# Patient Record
Sex: FEMALE | Race: WHITE | Employment: OTHER | URBAN - METROPOLITAN AREA
[De-identification: names, ages, dates, MRNs, and addresses within clinical notes are randomized per-mention and may not be internally consistent; named-entity substitution may affect disease eponyms.]

---

## 2017-12-27 ENCOUNTER — HOSPITAL ENCOUNTER (INPATIENT)
Age: 77
LOS: 5 days | Discharge: HOME | DRG: 389 | End: 2018-01-01
Attending: EMERGENCY MEDICINE | Admitting: INTERNAL MEDICINE
Payer: MEDICARE

## 2017-12-27 ENCOUNTER — APPOINTMENT (OUTPATIENT)
Dept: CT IMAGING | Age: 77
DRG: 389 | End: 2017-12-27
Payer: MEDICARE

## 2017-12-27 DIAGNOSIS — K56.609 SMALL BOWEL OBSTRUCTION (HCC): Primary | ICD-10-CM

## 2017-12-27 LAB
ABSOLUTE EOS #: 0 K/UL (ref 0–0.4)
ABSOLUTE IMMATURE GRANULOCYTE: ABNORMAL K/UL (ref 0–0.3)
ABSOLUTE LYMPH #: 1 K/UL (ref 1–4.8)
ABSOLUTE MONO #: 0.6 K/UL (ref 0.2–0.8)
ANION GAP SERPL CALCULATED.3IONS-SCNC: 15 MMOL/L (ref 9–17)
BASOPHILS # BLD: 0 % (ref 0–2)
BASOPHILS ABSOLUTE: 0 K/UL (ref 0–0.2)
BUN BLDV-MCNC: 25 MG/DL (ref 8–23)
BUN/CREAT BLD: 33 (ref 9–20)
CALCIUM SERPL-MCNC: 10 MG/DL (ref 8.6–10.4)
CHLORIDE BLD-SCNC: 102 MMOL/L (ref 98–107)
CO2: 22 MMOL/L (ref 20–31)
CREAT SERPL-MCNC: 0.76 MG/DL (ref 0.5–0.9)
DIFFERENTIAL TYPE: ABNORMAL
EOSINOPHILS RELATIVE PERCENT: 0 % (ref 1–4)
GFR AFRICAN AMERICAN: >60 ML/MIN
GFR NON-AFRICAN AMERICAN: >60 ML/MIN
GFR SERPL CREATININE-BSD FRML MDRD: ABNORMAL ML/MIN/{1.73_M2}
GFR SERPL CREATININE-BSD FRML MDRD: ABNORMAL ML/MIN/{1.73_M2}
GLUCOSE BLD-MCNC: 169 MG/DL (ref 70–99)
HCT VFR BLD CALC: 44.3 % (ref 36–46)
HEMOGLOBIN: 14.6 G/DL (ref 12–16)
IMMATURE GRANULOCYTES: ABNORMAL %
LACTIC ACID: 2.1 MMOL/L (ref 0.5–2.2)
LYMPHOCYTES # BLD: 7 % (ref 24–44)
MCH RBC QN AUTO: 29.9 PG (ref 26–34)
MCHC RBC AUTO-ENTMCNC: 33.1 G/DL (ref 31–37)
MCV RBC AUTO: 90.3 FL (ref 80–100)
MONOCYTES # BLD: 4 % (ref 1–7)
PDW BLD-RTO: 14 % (ref 11.5–14.5)
PLATELET # BLD: 258 K/UL (ref 130–400)
PLATELET ESTIMATE: ABNORMAL
PMV BLD AUTO: 9.8 FL (ref 6–12)
POTASSIUM SERPL-SCNC: 4.3 MMOL/L (ref 3.7–5.3)
RBC # BLD: 4.9 M/UL (ref 4–5.2)
RBC # BLD: ABNORMAL 10*6/UL
SEG NEUTROPHILS: 89 % (ref 36–66)
SEGMENTED NEUTROPHILS ABSOLUTE COUNT: 12.2 K/UL (ref 1.8–7.7)
SODIUM BLD-SCNC: 139 MMOL/L (ref 135–144)
WBC # BLD: 13.8 K/UL (ref 3.5–11)
WBC # BLD: ABNORMAL 10*3/UL

## 2017-12-27 PROCEDURE — 74177 CT ABD & PELVIS W/CONTRAST: CPT

## 2017-12-27 PROCEDURE — 96375 TX/PRO/DX INJ NEW DRUG ADDON: CPT

## 2017-12-27 PROCEDURE — 96374 THER/PROPH/DIAG INJ IV PUSH: CPT

## 2017-12-27 PROCEDURE — 83605 ASSAY OF LACTIC ACID: CPT

## 2017-12-27 PROCEDURE — 1200000000 HC SEMI PRIVATE

## 2017-12-27 PROCEDURE — 2580000003 HC RX 258: Performed by: EMERGENCY MEDICINE

## 2017-12-27 PROCEDURE — 80048 BASIC METABOLIC PNL TOTAL CA: CPT

## 2017-12-27 PROCEDURE — 99285 EMERGENCY DEPT VISIT HI MDM: CPT

## 2017-12-27 PROCEDURE — 6360000004 HC RX CONTRAST MEDICATION: Performed by: EMERGENCY MEDICINE

## 2017-12-27 PROCEDURE — 6360000002 HC RX W HCPCS: Performed by: EMERGENCY MEDICINE

## 2017-12-27 PROCEDURE — 0D9670Z DRAINAGE OF STOMACH WITH DRAINAGE DEVICE, VIA NATURAL OR ARTIFICIAL OPENING: ICD-10-PCS | Performed by: EMERGENCY MEDICINE

## 2017-12-27 PROCEDURE — 85025 COMPLETE CBC W/AUTO DIFF WBC: CPT

## 2017-12-27 RX ORDER — 0.9 % SODIUM CHLORIDE 0.9 %
1000 INTRAVENOUS SOLUTION INTRAVENOUS ONCE
Status: COMPLETED | OUTPATIENT
Start: 2017-12-27 | End: 2017-12-28

## 2017-12-27 RX ORDER — 0.9 % SODIUM CHLORIDE 0.9 %
50 INTRAVENOUS SOLUTION INTRAVENOUS ONCE
Status: DISCONTINUED | OUTPATIENT
Start: 2017-12-27 | End: 2017-12-28

## 2017-12-27 RX ORDER — ATORVASTATIN CALCIUM 20 MG/1
20 TABLET, FILM COATED ORAL DAILY
COMMUNITY

## 2017-12-27 RX ORDER — ONDANSETRON 2 MG/ML
4 INJECTION INTRAMUSCULAR; INTRAVENOUS ONCE
Status: COMPLETED | OUTPATIENT
Start: 2017-12-27 | End: 2017-12-27

## 2017-12-27 RX ORDER — LISINOPRIL 10 MG/1
10 TABLET ORAL DAILY
COMMUNITY

## 2017-12-27 RX ORDER — ASPIRIN 81 MG/1
81 TABLET, CHEWABLE ORAL DAILY
COMMUNITY

## 2017-12-27 RX ORDER — SODIUM CHLORIDE 0.9 % (FLUSH) 0.9 %
10 SYRINGE (ML) INJECTION 2 TIMES DAILY
Status: DISCONTINUED | OUTPATIENT
Start: 2017-12-27 | End: 2017-12-28

## 2017-12-27 RX ADMIN — Medication 10 ML: at 22:51

## 2017-12-27 RX ADMIN — SODIUM CHLORIDE 1000 ML: 9 INJECTION, SOLUTION INTRAVENOUS at 22:23

## 2017-12-27 RX ADMIN — ONDANSETRON HYDROCHLORIDE 4 MG: 2 INJECTION, SOLUTION INTRAVENOUS at 22:23

## 2017-12-27 RX ADMIN — IOPAMIDOL 125 ML: 755 INJECTION, SOLUTION INTRAVENOUS at 22:50

## 2017-12-27 RX ADMIN — Medication 1 MG: at 22:23

## 2017-12-27 ASSESSMENT — PAIN SCALES - GENERAL
PAINLEVEL_OUTOF10: 10
PAINLEVEL_OUTOF10: 6

## 2017-12-27 ASSESSMENT — PAIN DESCRIPTION - ORIENTATION: ORIENTATION: LOWER

## 2017-12-27 ASSESSMENT — ENCOUNTER SYMPTOMS
ABDOMINAL DISTENTION: 1
ALLERGIC/IMMUNOLOGIC NEGATIVE: 1
RESPIRATORY NEGATIVE: 1
EYES NEGATIVE: 1
ABDOMINAL PAIN: 1

## 2017-12-27 ASSESSMENT — PAIN DESCRIPTION - LOCATION: LOCATION: ABDOMEN

## 2017-12-27 ASSESSMENT — PAIN DESCRIPTION - DESCRIPTORS: DESCRIPTORS: ACHING;CRAMPING;SHARP

## 2017-12-27 ASSESSMENT — PAIN DESCRIPTION - FREQUENCY: FREQUENCY: CONTINUOUS

## 2017-12-28 PROBLEM — I10 ESSENTIAL HYPERTENSION: Status: ACTIVE | Noted: 2017-12-28

## 2017-12-28 PROBLEM — M19.90 OSTEOARTHRITIS: Status: ACTIVE | Noted: 2017-12-28

## 2017-12-28 LAB
ABSOLUTE EOS #: 0 K/UL (ref 0–0.4)
ABSOLUTE IMMATURE GRANULOCYTE: ABNORMAL K/UL (ref 0–0.3)
ABSOLUTE LYMPH #: 0.9 K/UL (ref 1–4.8)
ABSOLUTE MONO #: 0.8 K/UL (ref 0.2–0.8)
ALBUMIN SERPL-MCNC: 3.3 G/DL (ref 3.5–5.2)
ALBUMIN/GLOBULIN RATIO: ABNORMAL (ref 1–2.5)
ALP BLD-CCNC: 49 U/L (ref 35–104)
ALT SERPL-CCNC: 18 U/L (ref 5–33)
ANION GAP SERPL CALCULATED.3IONS-SCNC: 11 MMOL/L (ref 9–17)
AST SERPL-CCNC: 27 U/L
BASOPHILS # BLD: 0 % (ref 0–2)
BASOPHILS ABSOLUTE: 0 K/UL (ref 0–0.2)
BILIRUB SERPL-MCNC: 0.48 MG/DL (ref 0.3–1.2)
BUN BLDV-MCNC: 23 MG/DL (ref 8–23)
BUN/CREAT BLD: 42 (ref 9–20)
CALCIUM SERPL-MCNC: 8.8 MG/DL (ref 8.6–10.4)
CHLORIDE BLD-SCNC: 109 MMOL/L (ref 98–107)
CO2: 26 MMOL/L (ref 20–31)
CREAT SERPL-MCNC: 0.55 MG/DL (ref 0.5–0.9)
DIFFERENTIAL TYPE: ABNORMAL
EOSINOPHILS RELATIVE PERCENT: 0 % (ref 1–4)
GFR AFRICAN AMERICAN: >60 ML/MIN
GFR NON-AFRICAN AMERICAN: >60 ML/MIN
GFR SERPL CREATININE-BSD FRML MDRD: ABNORMAL ML/MIN/{1.73_M2}
GFR SERPL CREATININE-BSD FRML MDRD: ABNORMAL ML/MIN/{1.73_M2}
GLUCOSE BLD-MCNC: 117 MG/DL (ref 70–99)
HCT VFR BLD CALC: 37.4 % (ref 36–46)
HEMOGLOBIN: 12.8 G/DL (ref 12–16)
IMMATURE GRANULOCYTES: ABNORMAL %
LYMPHOCYTES # BLD: 8 % (ref 24–44)
MAGNESIUM: 1.9 MG/DL (ref 1.6–2.6)
MAGNESIUM: 2 MG/DL (ref 1.6–2.6)
MCH RBC QN AUTO: 30.6 PG (ref 26–34)
MCHC RBC AUTO-ENTMCNC: 34.1 G/DL (ref 31–37)
MCV RBC AUTO: 89.7 FL (ref 80–100)
MONOCYTES # BLD: 8 % (ref 1–7)
PDW BLD-RTO: 13.6 % (ref 11.5–14.5)
PLATELET # BLD: 220 K/UL (ref 130–400)
PLATELET ESTIMATE: ABNORMAL
PMV BLD AUTO: ABNORMAL FL (ref 6–12)
POTASSIUM SERPL-SCNC: 3.9 MMOL/L (ref 3.7–5.3)
RBC # BLD: 4.18 M/UL (ref 4–5.2)
RBC # BLD: ABNORMAL 10*6/UL
SEG NEUTROPHILS: 84 % (ref 36–66)
SEGMENTED NEUTROPHILS ABSOLUTE COUNT: 8.8 K/UL (ref 1.8–7.7)
SODIUM BLD-SCNC: 146 MMOL/L (ref 135–144)
TOTAL PROTEIN: 6.2 G/DL (ref 6.4–8.3)
WBC # BLD: 10.5 K/UL (ref 3.5–11)
WBC # BLD: ABNORMAL 10*3/UL

## 2017-12-28 PROCEDURE — 85025 COMPLETE CBC W/AUTO DIFF WBC: CPT

## 2017-12-28 PROCEDURE — 83735 ASSAY OF MAGNESIUM: CPT

## 2017-12-28 PROCEDURE — 82397 CHEMILUMINESCENT ASSAY: CPT

## 2017-12-28 PROCEDURE — 1200000000 HC SEMI PRIVATE

## 2017-12-28 PROCEDURE — 83520 IMMUNOASSAY QUANT NOS NONAB: CPT

## 2017-12-28 PROCEDURE — 36415 COLL VENOUS BLD VENIPUNCTURE: CPT

## 2017-12-28 PROCEDURE — 99223 1ST HOSP IP/OBS HIGH 75: CPT | Performed by: INTERNAL MEDICINE

## 2017-12-28 PROCEDURE — 6360000002 HC RX W HCPCS: Performed by: NURSE PRACTITIONER

## 2017-12-28 PROCEDURE — 6370000000 HC RX 637 (ALT 250 FOR IP): Performed by: SURGERY

## 2017-12-28 PROCEDURE — 88346 IMFLUOR 1ST 1ANTB STAIN PX: CPT

## 2017-12-28 PROCEDURE — 2580000003 HC RX 258: Performed by: NURSE PRACTITIONER

## 2017-12-28 PROCEDURE — 86140 C-REACTIVE PROTEIN: CPT

## 2017-12-28 PROCEDURE — 80053 COMPREHEN METABOLIC PANEL: CPT

## 2017-12-28 PROCEDURE — 88350 IMFLUOR EA ADDL 1ANTB STN PX: CPT

## 2017-12-28 PROCEDURE — 81479 UNLISTED MOLECULAR PATHOLOGY: CPT

## 2017-12-28 RX ORDER — MORPHINE SULFATE 2 MG/ML
2 INJECTION, SOLUTION INTRAMUSCULAR; INTRAVENOUS
Status: DISCONTINUED | OUTPATIENT
Start: 2017-12-28 | End: 2018-01-01 | Stop reason: HOSPADM

## 2017-12-28 RX ORDER — MAGNESIUM SULFATE 1 G/100ML
1 INJECTION INTRAVENOUS PRN
Status: DISCONTINUED | OUTPATIENT
Start: 2017-12-28 | End: 2018-01-01 | Stop reason: HOSPADM

## 2017-12-28 RX ORDER — POTASSIUM CHLORIDE 20 MEQ/1
40 TABLET, EXTENDED RELEASE ORAL PRN
Status: DISCONTINUED | OUTPATIENT
Start: 2017-12-28 | End: 2018-01-01 | Stop reason: HOSPADM

## 2017-12-28 RX ORDER — POTASSIUM CHLORIDE 7.45 MG/ML
10 INJECTION INTRAVENOUS PRN
Status: DISCONTINUED | OUTPATIENT
Start: 2017-12-28 | End: 2018-01-01 | Stop reason: HOSPADM

## 2017-12-28 RX ORDER — MORPHINE SULFATE 4 MG/ML
4 INJECTION, SOLUTION INTRAMUSCULAR; INTRAVENOUS
Status: DISCONTINUED | OUTPATIENT
Start: 2017-12-28 | End: 2018-01-01 | Stop reason: HOSPADM

## 2017-12-28 RX ORDER — POTASSIUM CHLORIDE 20MEQ/15ML
40 LIQUID (ML) ORAL PRN
Status: DISCONTINUED | OUTPATIENT
Start: 2017-12-28 | End: 2018-01-01 | Stop reason: HOSPADM

## 2017-12-28 RX ORDER — SODIUM CHLORIDE 0.9 % (FLUSH) 0.9 %
10 SYRINGE (ML) INJECTION PRN
Status: DISCONTINUED | OUTPATIENT
Start: 2017-12-28 | End: 2018-01-01 | Stop reason: HOSPADM

## 2017-12-28 RX ORDER — LATANOPROST 50 UG/ML
1 SOLUTION/ DROPS OPHTHALMIC NIGHTLY
COMMUNITY

## 2017-12-28 RX ORDER — ONDANSETRON 2 MG/ML
4 INJECTION INTRAMUSCULAR; INTRAVENOUS EVERY 6 HOURS PRN
Status: DISCONTINUED | OUTPATIENT
Start: 2017-12-28 | End: 2018-01-01 | Stop reason: HOSPADM

## 2017-12-28 RX ORDER — NICOTINE 21 MG/24HR
1 PATCH, TRANSDERMAL 24 HOURS TRANSDERMAL DAILY PRN
Status: DISCONTINUED | OUTPATIENT
Start: 2017-12-28 | End: 2018-01-01 | Stop reason: HOSPADM

## 2017-12-28 RX ORDER — SODIUM CHLORIDE 9 MG/ML
INJECTION, SOLUTION INTRAVENOUS CONTINUOUS
Status: DISCONTINUED | OUTPATIENT
Start: 2017-12-28 | End: 2017-12-29

## 2017-12-28 RX ORDER — SODIUM CHLORIDE 0.9 % (FLUSH) 0.9 %
10 SYRINGE (ML) INJECTION EVERY 12 HOURS SCHEDULED
Status: DISCONTINUED | OUTPATIENT
Start: 2017-12-28 | End: 2018-01-01 | Stop reason: HOSPADM

## 2017-12-28 RX ORDER — UBIDECARENONE 30 MG
1 CAPSULE ORAL DAILY
COMMUNITY

## 2017-12-28 RX ORDER — LATANOPROST 50 UG/ML
1 SOLUTION/ DROPS OPHTHALMIC NIGHTLY
Status: DISCONTINUED | OUTPATIENT
Start: 2017-12-28 | End: 2018-01-01 | Stop reason: HOSPADM

## 2017-12-28 RX ORDER — BISACODYL 10 MG
10 SUPPOSITORY, RECTAL RECTAL DAILY PRN
Status: DISCONTINUED | OUTPATIENT
Start: 2017-12-28 | End: 2018-01-01 | Stop reason: HOSPADM

## 2017-12-28 RX ORDER — ACETAMINOPHEN 325 MG/1
650 TABLET ORAL EVERY 4 HOURS PRN
Status: DISCONTINUED | OUTPATIENT
Start: 2017-12-28 | End: 2018-01-01 | Stop reason: HOSPADM

## 2017-12-28 RX ADMIN — BENZOCAINE AND MENTHOL 1 LOZENGE: 15; 3.6 LOZENGE ORAL at 11:04

## 2017-12-28 RX ADMIN — BENZOCAINE AND MENTHOL 1 LOZENGE: 15; 3.6 LOZENGE ORAL at 22:50

## 2017-12-28 RX ADMIN — ENOXAPARIN SODIUM 40 MG: 40 INJECTION SUBCUTANEOUS at 08:35

## 2017-12-28 RX ADMIN — LATANOPROST 1 DROP: 50 SOLUTION/ DROPS OPHTHALMIC at 22:51

## 2017-12-28 RX ADMIN — SODIUM CHLORIDE: 9 INJECTION, SOLUTION INTRAVENOUS at 08:35

## 2017-12-28 RX ADMIN — SODIUM CHLORIDE: 9 INJECTION, SOLUTION INTRAVENOUS at 00:55

## 2017-12-28 RX ADMIN — SODIUM CHLORIDE: 9 INJECTION, SOLUTION INTRAVENOUS at 17:06

## 2017-12-28 ASSESSMENT — PAIN DESCRIPTION - DESCRIPTORS
DESCRIPTORS: ACHING;DISCOMFORT
DESCRIPTORS: SORE

## 2017-12-28 ASSESSMENT — PAIN DESCRIPTION - PAIN TYPE
TYPE: ACUTE PAIN
TYPE: ACUTE PAIN

## 2017-12-28 ASSESSMENT — PAIN DESCRIPTION - LOCATION
LOCATION: THROAT
LOCATION: ABDOMEN

## 2017-12-28 ASSESSMENT — PAIN SCALES - GENERAL
PAINLEVEL_OUTOF10: 2
PAINLEVEL_OUTOF10: 1

## 2017-12-28 NOTE — CONSULTS
General Surgery Consult      Pt Name: Ree Lisa  MRN: [id number]  [place]: 1940  Date of evaluation: 2017  Primary Care Physician: Adamaris Potter MD   Patient evaluated at the request of  Dr. Sean Barros  Reason for evaluation: abdominal pain    SUBJECTIVE:   History of Chief Complaint:    Ree Lisa is a 65 y.o. female who presents with diffuse abdominal pain for a couple of days. Denies nausea or vomiting. States she thinks she has the flu. Denies prior episodes. Denies personal or family history of GI malignancy or IBD. She presented to ED and a CT scan was done, showing long segment thickening of small intestine and a possible partial small bowel obstruction. The patient never had a colonoscopy or EGD. Symptom onset has been gradual for a time period of 2 day(s). Severity is described as moderate to severe. Course of her symptoms over time is gradual.    Past Medical History   has a past medical history of Arthritis; Glaucoma; and Hypertension. Past Surgical History   has a past surgical history that includes  section; Cholecystectomy; Breast lumpectomy; and skin biopsy. Medications  Prior to Admission medications    Medication Sig Start Date End Date Taking? Authorizing Provider   aspirin 81 MG chewable tablet Take 81 mg by mouth daily   Yes Historical Provider, MD   atorvastatin (LIPITOR) 80 MG tablet Take 80 mg by mouth daily   Yes Historical Provider, MD   lisinopril (PRINIVIL;ZESTRIL) 40 MG tablet Take 40 mg by mouth daily   Yes Historical Provider, MD     Allergies  has No Known Allergies. Family History  family history is not on file. Social History   reports that she has never smoked. She has never used smokeless tobacco. She reports that she does not drink alcohol or use drugs.     Review of Systems:  General Denies any fever or chills  HEENT Denies any diplopia, tinnitus or vertigo  Resp Denies any shortness of breath, cough or wheezing  Cardiac Denies any chest pain, palpitations, claudication or edema  GI Denies any melena, hematochezia, hematemesis or pyrosis   Denies any frequency, urgency, hesitancy or incontinence  Heme Denies bruising or bleeding easily  Endocrine Denies any history of diabetes or thyroid disease  Neuro Denies any focal motor or sensory deficits    OBJECTIVE:   VITALS:  height is 4' 11\" (1.499 m) and weight is 207 lb 12.8 oz (94.3 kg). Her oral temperature is 97.5 °F (36.4 °C). Her blood pressure is 137/63 and her pulse is 92. Her respiration is 18 and oxygen saturation is 95%. CONSTITUTIONAL: Alert and oriented times 3, no acute distress and cooperative to examination with proper mood and affect. SKIN: Skin color, texture, turgor normal. No rashes or lesions. LYMPH: no cervical nodes, no inguinal nodes  HEENT: Head is normocephalic, atraumatic. EOMI, PERRLA  NECK: Supple, symmetrical, trachea midline, no adenopathy, thyroid symmetric, not enlarged and no tenderness, skin normal  CHEST/LUNGS: chest symmetric with normal A/P diameter, normal respiratory rate and rhythm, lungs clear to auscultation without wheezes, rales or rhonchi. No accessory muscle use. CARDIOVASCULAR: Heart regular rate and rhythm   ABDOMEN: Obese. Abnormal bowel sounds: diminished  Soft, nondistended, no masses or organomegaly. umbilical hernia. Percussion: Normal without hepatosplenomegally. Tenderness: absent  RECTAL: deferred, not clinically indicated  NEUROLOGIC: There are no focalizing motor or sensory deficits. CN II-XII are grossly intact.   EXTREMITIES: no cyanosis, no clubbing and no edema    LABS:     CBC with Differential:    Lab Results   Component Value Date    WBC 10.5 12/28/2017    RBC 4.18 12/28/2017    HGB 12.8 12/28/2017    HCT 37.4 12/28/2017     12/28/2017    MCV 89.7 12/28/2017    MCH 30.6 12/28/2017    MCHC 34.1 12/28/2017    RDW 13.6 12/28/2017    LYMPHOPCT 8 12/28/2017    MONOPCT 8 12/28/2017    BASOPCT 0 12/28/2017    MONOSABS 0.80

## 2017-12-28 NOTE — H&P
Vitamins-Minerals (MULTI FOR HER 50+) TABS Take 1 tablet by mouth daily   Yes Historical Provider, MD   B Complex Vitamins (VITAMIN B-COMPLEX PO) Take 1 tablet by mouth daily   Yes Historical Provider, MD   GLUCOSAMINE-CHONDROITIN PO Take 1 capsule by mouth daily   Yes Historical Provider, MD   Ascorbic Acid (VITAMIN C PO) Take 1 tablet by mouth daily   Yes Historical Provider, MD   aspirin 81 MG chewable tablet Take 81 mg by mouth daily   Yes Historical Provider, MD   atorvastatin (LIPITOR) 20 MG tablet Take 20 mg by mouth daily    Yes Historical Provider, MD   lisinopril (PRINIVIL;ZESTRIL) 10 MG tablet Take 10 mg by mouth daily    Yes Historical Provider, MD        Allergies:  Review of patient's allergies indicates no known allergies. Social History:   Tobacco:    reports that she has never smoked. She has never used smokeless tobacco.  Alcohol:      reports that she does not drink alcohol. Drug Use:  reports that she does not use drugs. Family History:   History reviewed. No pertinent family history. REVIEW OF SYSTEMS:  Constitutional: Negative for chills, diaphoresis, fever, malaise/fatigue and weight loss. HENT: Negative for ear pain, hearing loss, nosebleeds, sore throat and tinnitus. Complaint of sore throat and difficulty swallowing secondary to NG tube. Eyes: Negative for blurred vision, double vision, photophobia and pain. Respiratory: Negative for cough, hemoptysis, sputum production, shortness of breath and wheezing. Cardiovascular: Negative for palpitations, orthopnea, claudication, leg swelling and PND. Positive for heart murmur/aortic stenosis followed by cardiology. Gastrointestinal: Positive for abdominal pain, distention, nausea and emesis. .   Genitourinary: Negative for dysuria, flank pain, frequency, hematuria and urgency. Musculoskeletal: Negative for myalgias and neck pain. Positive for chronic low back pain, bilateral knee pain with the right greater than the left. Normal strength, sensation and reflexes       throughout       DATA:    CT abdomen and pelvis with contrast: 12/27/2017  1. Long segment mild dilatation of multiple loops of small bowel throughout   the abdomen and pelvis with air fluid levels proximally and fecalized   material distally.  A possible transition point is identified in the right   abdomen.  Findings are concerning for at least a partial small bowel   obstruction.  The distal small bowel and colon is relatively decompressed. 2. Normal appendix. 3. Indeterminate 2.3 cm left adrenal nodule.  If no priors are available for   comparison, Consider nonemergent further evaluation with CT/MRI versus one   year follow-up examination. 4. 15 mm pancreatic uncinate process cyst.  1 year follow-up examination,   preferably with MRI is recommended if no priors are available for comparison. 5. There is a 2.6 cm bilobed versus two adjacent cysts in the left kidney. 6. Status post cholecystectomy. 7. Nonspecific heterogeneity and mild thickening of the endometrial stripe. Correlation with history of vaginal bleeding is recommended.  Consider   nonemergent further evaluation with ultrasound.        Hematology:  Recent Labs      12/27/17 2201 12/28/17   0633   WBC  13.8*  10.5   RBC  4.90  4.18   HGB  14.6  12.8   HCT  44.3  37.4   MCV  90.3  89.7   MCH  29.9  30.6   MCHC  33.1  34.1   RDW  14.0  13.6   PLT  258  220   MPV  9.8  NOT REPORTED     Chemistry:  Recent Labs      12/27/17 2201 12/28/17   0633   NA  139  146*   K  4.3  3.9   CL  102  109*   CO2  22  26   GLUCOSE  169*  117*   BUN  25*  23   CREATININE  0.76  0.55   MG   --   2.0   ANIONGAP  15  11   LABGLOM  >60  >60   GFRAA  >60  >60   CALCIUM  10.0  8.8   LACTA  2.1   --      Recent Labs      12/28/17   0633   PROT  6.2*   LABALBU  3.3*   AST  27   ALT  18   ALKPHOS  49   BILITOT  0.48       Current Medications:  Current Facility-Administered Medications   Medication Dose Route Frequency

## 2017-12-28 NOTE — CARE COORDINATION
Case Management Initial Discharge Plan  Brittani Martini,         Readmission Risk              Readmission Risk:        3.5       Age 72 or Greater:  1    Admitted from SNF or Requires Paid or Family Care:  0    Currently has CHF,COPD,ARF,CRI,or is on dialysis:  0    Takes more than 5 Prescription Medications:  0    Takes Digoxin,Insulin,Anticoagulants,Narcotics or ASA/Plavix:  [address] in Past 12 Months:  0    On Disability:  0    Patient Considers own Health:  2.5            Met with:patient to discuss discharge plans. Information verified: address, contacts, phone number, , insurance Yes  PCP: Pearl Germain MD  Date of last visit:      Insurance Provider: Idania De Los Santos and [facility]    Discharge Planning  Current Residence:  house  Living Arrangements:  Spouse/Significant Other   Home has 1 stories/2 stairs to climb  Support Systems:  Children, Spouse/Significant Other  Current Services PTA:   none Agency:  none  Patient able to perform ADL's:Independent  DME in home:   none  DME used to aid ambulation prior to admission:    none  DME used during admission:   none    Potential Assistance Needed:  N/A    Pharmacy: [facility]   Potential Assistance Purchasing Medications:  No  Does patient want to participate in local refill/ meds to beds program?  No    Patient agreeable to home care: No  [place] of choice provided:  n/a      Type of Home Care Services:  None  Patient expects to be discharged to:  home (home)    Prior SNF/Rehab Placement and Facility:  none  Agreeable to SNF/Rehab: No  [place] of choice provided: n/a   Evaluation: no    Expected Discharge date:  17  Follow Up Appointment: Best Day/ Time: Tuesday PM    Transportation provider: self  Transportation arrangements needed for discharge: No    Discharge Plan: Met with pt at bedside. Pt admitted with SBO and currently has NG tube to suction. Pt is feeling much better.    Pt lives with her spouse

## 2017-12-28 NOTE — ED PROVIDER NOTES
[facility] ED  eMERGENCY dEPARTMENT eNCOUnter      Pt Name: Kisha Gaitan  MRN: [id number]  [place] 1940  Date of evaluation: 2017  Provider: Thierno Hernandez MD    [address] [place] [address]       Chief Complaint   Patient presents with    Abdominal Pain    Emesis    Nausea         HISTORY OF PRESENT ILLNESS   (Location/Symptom, Timing/Onset, Context/Setting, Quality, Duration, Modifying Factors, Severity)  Note limiting factors. Kisha Gaitan is a 65 y.o. female who presents to the emergency department With complaints of abdominal pain, nausea and vomiting since this morning. Was not able to keep any fluid down. The pain was increasing so she came to get evaluated, had a small bowel movement yesterday. Abdomen was distended, according to her hand. No urinary symptoms. No chest pain, no headache, no back pain. No swelling of the feet. No shortness of breath     HPI    Nursing Notes were reviewed. REVIEW OF SYSTEMS    (2-9 systems for level 4, 10 or more for level 5)     Review of Systems   Constitutional: Negative. HENT: Negative. Eyes: Negative. Respiratory: Negative. Cardiovascular: Negative. Gastrointestinal: Positive for abdominal distention and abdominal pain. Endocrine: Negative. Genitourinary: Negative. Musculoskeletal: Negative. Skin: Negative. Allergic/Immunologic: Negative. Neurological: Negative. Hematological: Negative. Psychiatric/Behavioral: Negative. All other systems reviewed and are negative. Except as noted above the remainder of the review of systems was reviewed and negative.        PAST MEDICAL HISTORY     Past Medical History:   Diagnosis Date    Arthritis     Glaucoma     Hypertension          SURGICAL HISTORY       Past Surgical History:   Procedure Laterality Date    BREAST LUMPECTOMY       SECTION      CHOLECYSTECTOMY      SKIN BIOPSY           CURRENT MEDICATIONS       Previous Medications    ASPIRIN 81 MG Musculoskeletal: Normal range of motion. She exhibits no edema, tenderness or deformity. Lymphadenopathy:     She has no cervical adenopathy. Neurological: She is alert and oriented to person, place, and time. She has normal reflexes. No cranial nerve deficit. Coordination normal.   Skin: Skin is warm. No rash noted. She is not diaphoretic. Psychiatric: She has a normal mood and affect. Her behavior is normal. Judgment and thought content normal.   Nursing note and vitals reviewed. DIAGNOSTIC RESULTS     EKG: All EKG's are interpreted by the Emergency Department Physician who either signs or Co-signs this chart in the absence of a cardiologist.    Not clinically indicated. RADIOLOGY:   Non-plain film images such as CT, Ultrasound and MRI are read by the radiologist. Patricia Ghosh radiographic images are visualized and preliminarily interpreted by the emergency physician with the below findings:    Ct Abdomen Pelvis W Iv Contrast    Result Date: 12/27/2017  EXAMINATION: CT OF THE ABDOMEN AND PELVIS WITH CONTRAST 12/27/2017 10:58 pm TECHNIQUE: CT of the abdomen and pelvis was performed with the administration of intravenous contrast. Multiplanar reformatted images are provided for review. Dose modulation, iterative reconstruction, and/or weight based adjustment of the mA/kV was utilized to reduce the radiation dose to as low as reasonably achievable. COMPARISON: None HISTORY: ORDERING SYSTEM PROVIDED HISTORY: Abdominal pain, possible Bowel obstruction TECHNOLOGIST PROVIDED HISTORY: IV Only Contrast FINDINGS: Lower Chest: Peripheral reticulations the lung bases may represent atelectasis or early fibrosis. Otherwise visualized lung bases demonstrate no acute consolidation. No acute abnormality of the visualized portion of the heart and mediastinum. Calcifications are noted in area of the aortic valve. Organs: No focal liver lesion identified. Mild intrahepatic bile duct dilatation is noted.   Status comparison. 5. There is a 2.6 cm bilobed versus two adjacent cysts in the left kidney. 6. Status post cholecystectomy. 7. Nonspecific heterogeneity and mild thickening of the endometrial stripe. Correlation with history of vaginal bleeding is recommended. Consider   nonemergent further evaluation with ultrasound. ED BEDSIDE ULTRASOUND:   Performed by ED Physician - none    LABS:  Labs Reviewed   BASIC METABOLIC PANEL - Abnormal; Notable for the following:        Result Value    Glucose 169 (*)     BUN 25 (*)     Bun/Cre Ratio 33 (*)     All other components within normal limits   UA W/REFLEX CULTURE   CBC WITH AUTO DIFFERENTIAL   LACTIC ACID       All other labs were within normal range or not returned as of this dictation. EMERGENCY DEPARTMENT COURSE and DIFFERENTIAL DIAGNOSIS/MDM:   Vitals:    Vitals:    12/27/17 2138   BP: (!) 159/65   Pulse: 98   Resp: 16   Temp: 98.1 °F (36.7 °C)   TempSrc: Oral   SpO2: 94%   Weight: 207 lb 12.8 oz (94.3 kg)   Height: 4' 11\" (1.499 m)       69-year-old female coming in with severe abdominal pain has small bowel obstruction. NG tube is inserted and will admit for further management    MDM    CRITICAL CARE TIME   Total Critical Care time was  minutes, excluding separately reportable procedures. There was a high probability of clinically significant/life threatening deterioration in the patient's condition which required my urgent intervention. CONSULTS:  IP CONSULT TO HOSPITALIST  IP CONSULT TO GENERAL SURGERY    PROCEDURES:  Unless otherwise noted below, none     Procedures    FINAL IMPRESSION      1. Small bowel obstruction          DISPOSITION/PLAN   DISPOSITION        PATIENT REFERRED TO:  No follow-up provider specified. DISCHARGE MEDICATIONS:  New Prescriptions    No medications on file          Problem List:  There is no problem list on file for this patient.           Summation      Patient Course:  69-year-old female coming in with severe abdominal pain has small bowel obstruction. NG tube is inserted and will admit for further management    ED Medications administered this visit:    Medications   sodium chloride flush 0.9 % injection 10 mL (10 mLs Intravenous Given 12/27/17 2251)   0.9 % sodium chloride bolus (50 mLs Intravenous Not Given 12/27/17 2255)   0.9 % sodium chloride bolus (1,000 mLs Intravenous New Bag 12/27/17 2223)   HYDROmorphone (DILAUDID) injection 1 mg (1 mg Intravenous Given 12/27/17 2223)   ondansetron (ZOFRAN) injection 4 mg (4 mg Intravenous Given 12/27/17 2223)   iopamidol (ISOVUE-370) 76 % injection 125 mL (125 mLs Intravenous Given 12/27/17 2250)       New Prescriptions from this visit:    New Prescriptions    No medications on file       Follow-up:  No follow-up provider specified. Final Impression:   1.  Small bowel obstruction               (Please note that portions of this note were completed with a voice recognition program.  Efforts were made to edit the dictations but occasionally words are mis-transcribed.)    Kamini Cuadra MD (electronically signed)  Attending Emergency Physician            Kamini Cuadra MD  12/27/17 3677

## 2017-12-29 ENCOUNTER — APPOINTMENT (OUTPATIENT)
Dept: GENERAL RADIOLOGY | Age: 77
DRG: 389 | End: 2017-12-29
Payer: MEDICARE

## 2017-12-29 LAB
ABSOLUTE EOS #: 0.3 K/UL (ref 0–0.4)
ABSOLUTE IMMATURE GRANULOCYTE: ABNORMAL K/UL (ref 0–0.3)
ABSOLUTE LYMPH #: 1.5 K/UL (ref 1–4.8)
ABSOLUTE MONO #: 0.8 K/UL (ref 0.2–0.8)
ANION GAP SERPL CALCULATED.3IONS-SCNC: 12 MMOL/L (ref 9–17)
BASOPHILS # BLD: 0 % (ref 0–2)
BASOPHILS ABSOLUTE: 0 K/UL (ref 0–0.2)
BUN BLDV-MCNC: 20 MG/DL (ref 8–23)
BUN/CREAT BLD: 34 (ref 9–20)
CALCIUM SERPL-MCNC: 8.7 MG/DL (ref 8.6–10.4)
CHLORIDE BLD-SCNC: 110 MMOL/L (ref 98–107)
CO2: 26 MMOL/L (ref 20–31)
CREAT SERPL-MCNC: 0.59 MG/DL (ref 0.5–0.9)
DIFFERENTIAL TYPE: ABNORMAL
EOSINOPHILS RELATIVE PERCENT: 3 % (ref 1–4)
GFR AFRICAN AMERICAN: >60 ML/MIN
GFR NON-AFRICAN AMERICAN: >60 ML/MIN
GFR SERPL CREATININE-BSD FRML MDRD: ABNORMAL ML/MIN/{1.73_M2}
GFR SERPL CREATININE-BSD FRML MDRD: ABNORMAL ML/MIN/{1.73_M2}
GLUCOSE BLD-MCNC: 93 MG/DL (ref 70–99)
HCT VFR BLD CALC: 35.5 % (ref 36–46)
HEMOGLOBIN: 11.8 G/DL (ref 12–16)
IMMATURE GRANULOCYTES: ABNORMAL %
LYMPHOCYTES # BLD: 20 % (ref 24–44)
MAGNESIUM: 2 MG/DL (ref 1.6–2.6)
MCH RBC QN AUTO: 30.1 PG (ref 26–34)
MCHC RBC AUTO-ENTMCNC: 33.3 G/DL (ref 31–37)
MCV RBC AUTO: 90.3 FL (ref 80–100)
MONOCYTES # BLD: 10 % (ref 1–7)
PDW BLD-RTO: 13.8 % (ref 11.5–14.5)
PLATELET # BLD: 189 K/UL (ref 130–400)
PLATELET ESTIMATE: ABNORMAL
PMV BLD AUTO: ABNORMAL FL (ref 6–12)
POTASSIUM SERPL-SCNC: 3.4 MMOL/L (ref 3.7–5.3)
RBC # BLD: 3.93 M/UL (ref 4–5.2)
RBC # BLD: ABNORMAL 10*6/UL
SEG NEUTROPHILS: 67 % (ref 36–66)
SEGMENTED NEUTROPHILS ABSOLUTE COUNT: 5.1 K/UL (ref 1.8–7.7)
SODIUM BLD-SCNC: 148 MMOL/L (ref 135–144)
WBC # BLD: 7.7 K/UL (ref 3.5–11)
WBC # BLD: ABNORMAL 10*3/UL

## 2017-12-29 PROCEDURE — 6370000000 HC RX 637 (ALT 250 FOR IP): Performed by: SURGERY

## 2017-12-29 PROCEDURE — 2500000003 HC RX 250 WO HCPCS: Performed by: SURGERY

## 2017-12-29 PROCEDURE — 1200000000 HC SEMI PRIVATE

## 2017-12-29 PROCEDURE — 36415 COLL VENOUS BLD VENIPUNCTURE: CPT

## 2017-12-29 PROCEDURE — 85025 COMPLETE CBC W/AUTO DIFF WBC: CPT

## 2017-12-29 PROCEDURE — 83735 ASSAY OF MAGNESIUM: CPT

## 2017-12-29 PROCEDURE — 6360000004 HC RX CONTRAST MEDICATION: Performed by: SURGERY

## 2017-12-29 PROCEDURE — 99232 SBSQ HOSP IP/OBS MODERATE 35: CPT | Performed by: INTERNAL MEDICINE

## 2017-12-29 PROCEDURE — 6360000002 HC RX W HCPCS: Performed by: NURSE PRACTITIONER

## 2017-12-29 PROCEDURE — 2580000003 HC RX 258: Performed by: NURSE PRACTITIONER

## 2017-12-29 PROCEDURE — 80048 BASIC METABOLIC PNL TOTAL CA: CPT

## 2017-12-29 PROCEDURE — 74250 X-RAY XM SM INT 1CNTRST STD: CPT

## 2017-12-29 RX ORDER — DEXTROSE, SODIUM CHLORIDE, AND POTASSIUM CHLORIDE 5; .45; .15 G/100ML; G/100ML; G/100ML
INJECTION INTRAVENOUS CONTINUOUS
Status: DISCONTINUED | OUTPATIENT
Start: 2017-12-29 | End: 2018-01-01 | Stop reason: HOSPADM

## 2017-12-29 RX ADMIN — LATANOPROST 1 DROP: 50 SOLUTION/ DROPS OPHTHALMIC at 22:50

## 2017-12-29 RX ADMIN — POTASSIUM CHLORIDE, DEXTROSE MONOHYDRATE AND SODIUM CHLORIDE: 150; 5; 450 INJECTION, SOLUTION INTRAVENOUS at 16:49

## 2017-12-29 RX ADMIN — DIATRIZOATE MEGLUMINE AND DIATRIZOATE SODIUM 240 ML: 660; 100 LIQUID ORAL; RECTAL at 10:39

## 2017-12-29 RX ADMIN — ENOXAPARIN SODIUM 40 MG: 40 INJECTION SUBCUTANEOUS at 08:43

## 2017-12-29 RX ADMIN — BENZOCAINE AND MENTHOL 1 LOZENGE: 15; 3.6 LOZENGE ORAL at 22:50

## 2017-12-29 RX ADMIN — POTASSIUM CHLORIDE, DEXTROSE MONOHYDRATE AND SODIUM CHLORIDE: 150; 5; 450 INJECTION, SOLUTION INTRAVENOUS at 08:30

## 2017-12-29 RX ADMIN — SODIUM CHLORIDE: 9 INJECTION, SOLUTION INTRAVENOUS at 01:09

## 2017-12-29 ASSESSMENT — PAIN DESCRIPTION - ORIENTATION: ORIENTATION: LOWER

## 2017-12-29 ASSESSMENT — PAIN SCALES - GENERAL
PAINLEVEL_OUTOF10: 0
PAINLEVEL_OUTOF10: 0
PAINLEVEL_OUTOF10: 1
PAINLEVEL_OUTOF10: 0

## 2017-12-29 ASSESSMENT — PAIN DESCRIPTION - PAIN TYPE: TYPE: ACUTE PAIN

## 2017-12-29 ASSESSMENT — PAIN DESCRIPTION - FREQUENCY: FREQUENCY: INTERMITTENT

## 2017-12-29 ASSESSMENT — PAIN DESCRIPTION - LOCATION: LOCATION: THROAT

## 2017-12-29 ASSESSMENT — PAIN DESCRIPTION - DESCRIPTORS: DESCRIPTORS: SORE

## 2017-12-29 NOTE — PROGRESS NOTES
General Surgery Progress Note            PATIENT NAME: Kisha Gaitan     TODAY'S DATE: 12/29/2017, 8:19 AM    SUBJECTIVE:    Pt  seen and examined. OBJECTIVE:   VITALS:  BP (!) 143/69   Pulse 74   Temp 98.6 °F (37 °C) (Oral)   Resp 18   Ht 4' 11\" (1.499 m)   Wt 203 lb 11.2 oz (92.4 kg)   SpO2 96%   BMI 41.14 kg/m²      INTAKE/OUTPUT:      Intake/Output Summary (Last 24 hours) at 12/29/17 0819  Last data filed at 12/29/17 7223   Gross per 24 hour   Intake             2822 ml   Output             1200 ml   Net             1622 ml                 CONSTITUTIONAL:  awake and alert. no apparent distress  HEART:   RRR  LUNGS:   CTAB  ABDOMEN:   Abdomen soft, non-tender, non-distended. Hypoactive bowel sounds. - Flatus  EXTREMITIES:   No edema or tenderness    Data:  CBC with Differential:    Lab Results   Component Value Date    WBC 7.7 12/29/2017    RBC 3.93 12/29/2017    HGB 11.8 12/29/2017    HCT 35.5 12/29/2017     12/29/2017    MCV 90.3 12/29/2017    MCH 30.1 12/29/2017    MCHC 33.3 12/29/2017    RDW 13.8 12/29/2017    LYMPHOPCT 20 12/29/2017    MONOPCT 10 12/29/2017    BASOPCT 0 12/29/2017    MONOSABS 0.80 12/29/2017    LYMPHSABS 1.50 12/29/2017    EOSABS 0.30 12/29/2017    BASOSABS 0.00 12/29/2017    DIFFTYPE NOT REPORTED 12/29/2017     BMP:    Lab Results   Component Value Date     12/29/2017    K 3.4 12/29/2017     12/29/2017    CO2 26 12/29/2017    BUN 20 12/29/2017    LABALBU 3.3 12/28/2017    CREATININE 0.59 12/29/2017    CALCIUM 8.7 12/29/2017    GFRAA >60 12/29/2017    LABGLOM >60 12/29/2017    GLUCOSE 93 12/29/2017         ASSESSMENT     Principal Problem:    SBO (small bowel obstruction)  Active Problems:    Osteoarthritis    Essential hypertension      Plan  1. Small bowel follow-through today  2. Continue nasogastric tube to suction  3.  Up as tolerated

## 2017-12-29 NOTE — PROGRESS NOTES
[facility] - Progress Note    2017   1:29 PM    Name:  Luigi Galindo   :    1940   Age:  65 y.o. female  MRN:    [id number]     Acct:     [de-identified]   Room:     Day: 2  Hospital: [facility] Date: 2017  9:48 PM  PCP: Oz Helton MD    Subjective:     C/C:   Chief Complaint   Patient presents with    Abdominal Pain    Emesis    Nausea       Interval History: Status: improved. Small bowel follow through x-ray reveals no evidence of SBO. NG tube will be clamped for 2-3 hours. If no evidence of nausea the patient will be tried on clear liquids. Potassium was 3.4 and will be corrected. Laboratories will be reordered the morning and electrolytes will be replaced as needed. The patient's only complaint is still that of a sore throat secondary to her NG tube. Laboratory does reveal a progressive anemia which will also be checked in the morning. History: \"Ivone Mccoy is a 65 y.o.  female who presents to the emergency department with abdominal pain, nausea and vomiting. The patient described several days of increasing symptomatology culminating in nausea and emesis on the morning of admission. The patient has never had similar symptoms prior. She denies chest pain, shortness of breath, lower extremity swelling or urinary symptomatology. Her only prior abdominal surgeries were a  section and a cholecystectomy many years ago. She currently has a nasogastric tube to low intermittent suction for decompression. Her complaint at the moment is that of her throat being sore and difficulty swallowing. \"    ROS:  Constitutional: Negative for chills, diaphoresis, fever, malaise/fatigue and weight loss. HENT: Negative for ear pain, hearing loss, nosebleeds, sore throat and tinnitus. Complaint of sore throat and difficulty swallowing secondary to NG tube. Eyes: Negative for blurred vision, double vision, photophobia and pain. Respiratory: Negative for cough, hemoptysis, sputum production, shortness of breath and wheezing. Cardiovascular: Negative for palpitations, orthopnea, claudication, leg swelling and PND. Positive for heart murmur/aortic stenosis followed by cardiology. Gastrointestinal: Positive for abdominal pain, distention, nausea and emesis. .   Genitourinary: Negative for dysuria, flank pain, frequency, hematuria and urgency. Musculoskeletal: Negative for myalgias and neck pain. Positive for chronic low back pain, bilateral knee pain with the right greater than the left. Skin: Negative for itching and rash. Positive for multiple nevi. Neurological: Negative for dizziness, tingling, tremors, sensory change, focal weakness, seizures, weakness and headaches. Endo/Heme/Allergies: Does not bruise/bleed easily. Psychiatric/Behavioral: Negative for depression. The patient is not nervous/anxious. Medications:      Allergies: No Known Allergies    Current Meds:   dextrose 5 % and 0.45 % NaCl with KCl 20 mEq infusion Continuous   diatrizoate meglumine-sodium (GASTROGRAFIN) 66-10 % solution 240 mL ONCE PRN   sodium chloride flush 0.9 % injection 10 mL 2 times per day   sodium chloride flush 0.9 % injection 10 mL PRN   potassium chloride (KLOR-CON M) extended release tablet 40 mEq PRN   Or    potassium chloride 20 MEQ/15ML (10%) oral solution 40 mEq PRN   Or    potassium chloride 10 mEq/100 mL IVPB (Peripheral Line) PRN   magnesium sulfate 1 g in dextrose 5% 100 mL IVPB PRN   acetaminophen (TYLENOL) tablet 650 mg Q4H PRN   morphine injection 2 mg Q2H PRN   Or    morphine injection 4 mg Q2H PRN   magnesium hydroxide (MILK OF MAGNESIA) 400 MG/5ML suspension 30 mL Daily PRN   bisacodyl (DULCOLAX) suppository 10 mg Daily PRN   ondansetron (ZOFRAN) injection 4 mg Q6H PRN   nicotine (NICODERM CQ) 21 MG/24HR 1 patch Daily PRN   enoxaparin (LOVENOX) injection 40 mg Daily   benzocaine-menthol (CEPACOL SORE THROAT) lozenge 1 PERRL, conjunctiva/corneas clear, EOM's intact        Ears:    Normal external ear canals, both ears   Nose:   Nares normal, septum midline, mucosa normal, no drainage    or sinus tenderness. NG tube to low intermittent suction in place    Throat:   Lips, mucosa, and tongue normal   Neck:   Supple, symmetrical, trachea midline, no adenopathy;        thyroid:  No enlargement/tenderness/nodules; no carotid    bruit or JVD   Back:     Symmetric, no curvature, ROM normal, no CVA tenderness   Lungs:     Clear to auscultation bilaterally, respirations unlabored   Chest wall:    No tenderness or deformity   Heart:    Regular rate and rhythm, S1 and S2 normal, no murmur, rub   or gallop   Abdomen:     Soft, mildly tender, bowel sounds active all four quadrants,     no masses, no organomegaly   Extremities:   Extremities normal, atraumatic, no cyanosis or edema   Pulses:   2+ and symmetric all extremities   Skin:   Multiple nevi of her abdomen and back    Lymph nodes:   Cervical, supraclavicular, and axillary nodes normal   Neurologic:   CNII-XII intact. Normal strength, sensation and reflexes       throughout     Assessment:     Primary Problem  SBO (small bowel obstruction)     Active Hospital Problems    Diagnosis Date Noted    Osteoarthritis [M19.90] 12/28/2017    Essential hypertension [I10] 12/28/2017    SBO (small bowel obstruction) [K56.609] 12/27/2017     Past Medical History:   Diagnosis Date    Arthritis     Glaucoma     Hypertension       Plan:     1. Clamp NG tube for 2-3 hours  2. If no significant residual and no nausea and clear liquids  3. Monitor electrolytes replace as needed  4. Repeat laboratories in the morning  5. DVT prophylaxis  6. GI prophylaxis  7. Cepacol throat lozenges when necessary  8.  Potassium and magnesium replacement scale      Electronically signed by Dejuan Lang DO on 12/29/2017 at 1:29 PM

## 2017-12-30 PROBLEM — E87.0 HYPERNATREMIA: Status: ACTIVE | Noted: 2017-12-30

## 2017-12-30 LAB
ABSOLUTE EOS #: 0.2 K/UL (ref 0–0.4)
ABSOLUTE IMMATURE GRANULOCYTE: ABNORMAL K/UL (ref 0–0.3)
ABSOLUTE LYMPH #: 1.2 K/UL (ref 1–4.8)
ABSOLUTE MONO #: 0.9 K/UL (ref 0.2–0.8)
ANION GAP SERPL CALCULATED.3IONS-SCNC: 12 MMOL/L (ref 9–17)
BASOPHILS # BLD: 0 % (ref 0–2)
BASOPHILS ABSOLUTE: 0 K/UL (ref 0–0.2)
BUN BLDV-MCNC: 15 MG/DL (ref 8–23)
BUN/CREAT BLD: 26 (ref 9–20)
CALCIUM SERPL-MCNC: 8.6 MG/DL (ref 8.6–10.4)
CHLORIDE BLD-SCNC: 109 MMOL/L (ref 98–107)
CO2: 26 MMOL/L (ref 20–31)
CREAT SERPL-MCNC: 0.57 MG/DL (ref 0.5–0.9)
DIFFERENTIAL TYPE: ABNORMAL
EOSINOPHILS RELATIVE PERCENT: 3 % (ref 1–4)
GFR AFRICAN AMERICAN: >60 ML/MIN
GFR NON-AFRICAN AMERICAN: >60 ML/MIN
GFR SERPL CREATININE-BSD FRML MDRD: ABNORMAL ML/MIN/{1.73_M2}
GFR SERPL CREATININE-BSD FRML MDRD: ABNORMAL ML/MIN/{1.73_M2}
GLUCOSE BLD-MCNC: 134 MG/DL (ref 70–99)
HCT VFR BLD CALC: 35.6 % (ref 36–46)
HEMOGLOBIN: 11.7 G/DL (ref 12–16)
IMMATURE GRANULOCYTES: ABNORMAL %
LYMPHOCYTES # BLD: 13 % (ref 24–44)
MAGNESIUM: 1.9 MG/DL (ref 1.6–2.6)
MCH RBC QN AUTO: 29.6 PG (ref 26–34)
MCHC RBC AUTO-ENTMCNC: 32.9 G/DL (ref 31–37)
MCV RBC AUTO: 90.2 FL (ref 80–100)
MONOCYTES # BLD: 10 % (ref 1–7)
PDW BLD-RTO: 13.4 % (ref 11.5–14.5)
PLATELET # BLD: 185 K/UL (ref 130–400)
PLATELET ESTIMATE: ABNORMAL
PMV BLD AUTO: ABNORMAL FL (ref 6–12)
POTASSIUM SERPL-SCNC: 3.7 MMOL/L (ref 3.7–5.3)
RBC # BLD: 3.94 M/UL (ref 4–5.2)
RBC # BLD: ABNORMAL 10*6/UL
SEG NEUTROPHILS: 74 % (ref 36–66)
SEGMENTED NEUTROPHILS ABSOLUTE COUNT: 6.6 K/UL (ref 1.8–7.7)
SODIUM BLD-SCNC: 147 MMOL/L (ref 135–144)
WBC # BLD: 8.9 K/UL (ref 3.5–11)
WBC # BLD: ABNORMAL 10*3/UL

## 2017-12-30 PROCEDURE — 99222 1ST HOSP IP/OBS MODERATE 55: CPT | Performed by: INTERNAL MEDICINE

## 2017-12-30 PROCEDURE — 6360000002 HC RX W HCPCS: Performed by: SURGERY

## 2017-12-30 PROCEDURE — 6360000002 HC RX W HCPCS: Performed by: NURSE PRACTITIONER

## 2017-12-30 PROCEDURE — 1200000000 HC SEMI PRIVATE

## 2017-12-30 PROCEDURE — 85025 COMPLETE CBC W/AUTO DIFF WBC: CPT

## 2017-12-30 PROCEDURE — 6370000000 HC RX 637 (ALT 250 FOR IP): Performed by: INTERNAL MEDICINE

## 2017-12-30 PROCEDURE — 99232 SBSQ HOSP IP/OBS MODERATE 35: CPT | Performed by: INTERNAL MEDICINE

## 2017-12-30 PROCEDURE — 2500000003 HC RX 250 WO HCPCS: Performed by: SURGERY

## 2017-12-30 PROCEDURE — 83735 ASSAY OF MAGNESIUM: CPT

## 2017-12-30 PROCEDURE — 80048 BASIC METABOLIC PNL TOTAL CA: CPT

## 2017-12-30 PROCEDURE — 36415 COLL VENOUS BLD VENIPUNCTURE: CPT

## 2017-12-30 RX ORDER — METOCLOPRAMIDE HYDROCHLORIDE 5 MG/ML
5 INJECTION INTRAMUSCULAR; INTRAVENOUS EVERY 6 HOURS SCHEDULED
Status: DISCONTINUED | OUTPATIENT
Start: 2017-12-30 | End: 2018-01-01 | Stop reason: HOSPADM

## 2017-12-30 RX ADMIN — POTASSIUM CHLORIDE, DEXTROSE MONOHYDRATE AND SODIUM CHLORIDE: 150; 5; 450 INJECTION, SOLUTION INTRAVENOUS at 23:06

## 2017-12-30 RX ADMIN — POTASSIUM CHLORIDE, DEXTROSE MONOHYDRATE AND SODIUM CHLORIDE: 150; 5; 450 INJECTION, SOLUTION INTRAVENOUS at 00:16

## 2017-12-30 RX ADMIN — SALINE NASAL SPRAY 2 SPRAY: 1.5 SOLUTION NASAL at 12:28

## 2017-12-30 RX ADMIN — POTASSIUM CHLORIDE, DEXTROSE MONOHYDRATE AND SODIUM CHLORIDE: 150; 5; 450 INJECTION, SOLUTION INTRAVENOUS at 15:42

## 2017-12-30 RX ADMIN — SALINE NASAL SPRAY 2 SPRAY: 1.5 SOLUTION NASAL at 23:06

## 2017-12-30 RX ADMIN — POTASSIUM CHLORIDE, DEXTROSE MONOHYDRATE AND SODIUM CHLORIDE: 150; 5; 450 INJECTION, SOLUTION INTRAVENOUS at 08:00

## 2017-12-30 RX ADMIN — METOCLOPRAMIDE 5 MG: 5 INJECTION, SOLUTION INTRAMUSCULAR; INTRAVENOUS at 21:08

## 2017-12-30 RX ADMIN — LATANOPROST 1 DROP: 50 SOLUTION/ DROPS OPHTHALMIC at 23:06

## 2017-12-30 RX ADMIN — ENOXAPARIN SODIUM 40 MG: 40 INJECTION SUBCUTANEOUS at 08:00

## 2017-12-30 RX ADMIN — METOCLOPRAMIDE 5 MG: 5 INJECTION, SOLUTION INTRAMUSCULAR; INTRAVENOUS at 12:27

## 2017-12-30 NOTE — PROGRESS NOTES
[address] [facility]    Progress Note    12/30/2017    11:49 AM    Name:   Jolanta Bajwa  MRN:     [id number]     [place]:      [de-identified]   Room:   2021/2021-01   Day:  3  Admit Date:  12/27/2017  9:48 PM    PCP:   Vignesh Colon MD  Code Status:  Full Code    Subjective:     C/C:   Chief Complaint   Patient presents with    Abdominal Pain    Emesis    Nausea     Interval History Status: improved. Patient still has NG clamped this morning. General surgery states that they will check residuals again before removing. She does c/o some nasal dryness and stuffiness and a sore throat. She denies any N/V and had several loose stools overnight. Brief History:     64 yo admitted with abdominal pain, N/V. She was found to have a SBO on CT abdomen    Review of Systems:     Constitutional:  negative for chills, fevers, sweats  Respiratory:  negative for cough, dyspnea on exertion, shortness of breath, wheezing  Cardiovascular:  negative for chest pain, chest pressure/discomfort, lower extremity edema, palpitations  Gastrointestinal:  negative for abdominal pain, constipation, diarrhea, nausea, vomiting  Neurological:  negative for dizziness, headache    Medications:      Allergies:  No Known Allergies    Current Meds:   Scheduled Meds:    metoclopramide  5 mg Intravenous Q6H    sodium chloride flush  10 mL Intravenous 2 times per day    enoxaparin  40 mg Subcutaneous Daily    latanoprost  1 drop Both Eyes Nightly     Continuous Infusions:    dextrose 5% and 0.45% NaCl with KCl 20 mEq 125 mL/hr at 12/30/17 0800     PRN Meds: diatrizoate meglumine-sodium, sodium chloride flush, potassium chloride **OR** potassium chloride **OR** potassium chloride, magnesium sulfate, acetaminophen, morphine **OR** morphine, magnesium hydroxide, bisacodyl, ondansetron, nicotine, benzocaine-menthol    Data:     Past Medical History:   has a past medical history of

## 2017-12-30 NOTE — CONSULTS
scan.  Resolved based on small bowel follow-through. Surgery following. KUB in AM. She would benefit from elective colonoscopy. Thank you for allowing me to participate in the care of Ms. Mccoy. For any further questions please do not hesitate to contact me.        Esme Carter MD

## 2017-12-31 ENCOUNTER — APPOINTMENT (OUTPATIENT)
Dept: GENERAL RADIOLOGY | Age: 77
DRG: 389 | End: 2017-12-31
Payer: MEDICARE

## 2017-12-31 PROBLEM — E87.0 HYPERNATREMIA: Status: RESOLVED | Noted: 2017-12-30 | Resolved: 2017-12-31

## 2017-12-31 LAB
ABSOLUTE EOS #: 0.2 K/UL (ref 0–0.4)
ABSOLUTE IMMATURE GRANULOCYTE: ABNORMAL K/UL (ref 0–0.3)
ABSOLUTE LYMPH #: 1.2 K/UL (ref 1–4.8)
ABSOLUTE MONO #: 0.9 K/UL (ref 0.2–0.8)
ANION GAP SERPL CALCULATED.3IONS-SCNC: 12 MMOL/L (ref 9–17)
BASOPHILS # BLD: 0 % (ref 0–2)
BASOPHILS ABSOLUTE: 0 K/UL (ref 0–0.2)
BUN BLDV-MCNC: 10 MG/DL (ref 8–23)
BUN/CREAT BLD: 19 (ref 9–20)
CALCIUM SERPL-MCNC: 8.8 MG/DL (ref 8.6–10.4)
CHLORIDE BLD-SCNC: 104 MMOL/L (ref 98–107)
CO2: 23 MMOL/L (ref 20–31)
CREAT SERPL-MCNC: 0.54 MG/DL (ref 0.5–0.9)
DIFFERENTIAL TYPE: ABNORMAL
EOSINOPHILS RELATIVE PERCENT: 2 % (ref 1–4)
GFR AFRICAN AMERICAN: >60 ML/MIN
GFR NON-AFRICAN AMERICAN: >60 ML/MIN
GFR SERPL CREATININE-BSD FRML MDRD: ABNORMAL ML/MIN/{1.73_M2}
GFR SERPL CREATININE-BSD FRML MDRD: ABNORMAL ML/MIN/{1.73_M2}
GLUCOSE BLD-MCNC: 128 MG/DL (ref 70–99)
HCT VFR BLD CALC: 34.8 % (ref 36–46)
HEMOGLOBIN: 11.4 G/DL (ref 12–16)
IMMATURE GRANULOCYTES: ABNORMAL %
LYMPHOCYTES # BLD: 12 % (ref 24–44)
MAGNESIUM: 1.7 MG/DL (ref 1.6–2.6)
MCH RBC QN AUTO: 30 PG (ref 26–34)
MCHC RBC AUTO-ENTMCNC: 32.9 G/DL (ref 31–37)
MCV RBC AUTO: 91.2 FL (ref 80–100)
MONOCYTES # BLD: 9 % (ref 1–7)
PDW BLD-RTO: 14 % (ref 11.5–14.5)
PLATELET # BLD: 176 K/UL (ref 130–400)
PLATELET ESTIMATE: ABNORMAL
PMV BLD AUTO: 9.1 FL (ref 6–12)
POTASSIUM SERPL-SCNC: 3.7 MMOL/L (ref 3.7–5.3)
RBC # BLD: 3.82 M/UL (ref 4–5.2)
RBC # BLD: ABNORMAL 10*6/UL
SEG NEUTROPHILS: 77 % (ref 36–66)
SEGMENTED NEUTROPHILS ABSOLUTE COUNT: 7.5 K/UL (ref 1.8–7.7)
SODIUM BLD-SCNC: 139 MMOL/L (ref 135–144)
WBC # BLD: 9.7 K/UL (ref 3.5–11)
WBC # BLD: ABNORMAL 10*3/UL

## 2017-12-31 PROCEDURE — 99232 SBSQ HOSP IP/OBS MODERATE 35: CPT | Performed by: INTERNAL MEDICINE

## 2017-12-31 PROCEDURE — 6360000002 HC RX W HCPCS: Performed by: NURSE PRACTITIONER

## 2017-12-31 PROCEDURE — 85025 COMPLETE CBC W/AUTO DIFF WBC: CPT

## 2017-12-31 PROCEDURE — 80048 BASIC METABOLIC PNL TOTAL CA: CPT

## 2017-12-31 PROCEDURE — 36415 COLL VENOUS BLD VENIPUNCTURE: CPT

## 2017-12-31 PROCEDURE — 2500000003 HC RX 250 WO HCPCS: Performed by: SURGERY

## 2017-12-31 PROCEDURE — 6370000000 HC RX 637 (ALT 250 FOR IP): Performed by: INTERNAL MEDICINE

## 2017-12-31 PROCEDURE — 6360000002 HC RX W HCPCS: Performed by: SURGERY

## 2017-12-31 PROCEDURE — 1200000000 HC SEMI PRIVATE

## 2017-12-31 PROCEDURE — 74000 XR ABDOMEN LIMITED (KUB): CPT

## 2017-12-31 PROCEDURE — 83735 ASSAY OF MAGNESIUM: CPT

## 2017-12-31 RX ORDER — ATORVASTATIN CALCIUM 20 MG/1
20 TABLET, FILM COATED ORAL DAILY
Status: DISCONTINUED | OUTPATIENT
Start: 2017-12-31 | End: 2018-01-01 | Stop reason: HOSPADM

## 2017-12-31 RX ORDER — LISINOPRIL 10 MG/1
10 TABLET ORAL DAILY
Status: DISCONTINUED | OUTPATIENT
Start: 2017-12-31 | End: 2018-01-01 | Stop reason: HOSPADM

## 2017-12-31 RX ORDER — ASPIRIN 81 MG/1
81 TABLET, CHEWABLE ORAL DAILY
Status: DISCONTINUED | OUTPATIENT
Start: 2017-12-31 | End: 2018-01-01 | Stop reason: HOSPADM

## 2017-12-31 RX ADMIN — LISINOPRIL 10 MG: 10 TABLET ORAL at 16:59

## 2017-12-31 RX ADMIN — METOCLOPRAMIDE 5 MG: 5 INJECTION, SOLUTION INTRAMUSCULAR; INTRAVENOUS at 23:36

## 2017-12-31 RX ADMIN — METOCLOPRAMIDE 5 MG: 5 INJECTION, SOLUTION INTRAMUSCULAR; INTRAVENOUS at 12:15

## 2017-12-31 RX ADMIN — ENOXAPARIN SODIUM 40 MG: 40 INJECTION SUBCUTANEOUS at 12:15

## 2017-12-31 RX ADMIN — POTASSIUM CHLORIDE, DEXTROSE MONOHYDRATE AND SODIUM CHLORIDE: 150; 5; 450 INJECTION, SOLUTION INTRAVENOUS at 14:10

## 2017-12-31 RX ADMIN — METOCLOPRAMIDE 5 MG: 5 INJECTION, SOLUTION INTRAMUSCULAR; INTRAVENOUS at 17:01

## 2017-12-31 RX ADMIN — POTASSIUM CHLORIDE, DEXTROSE MONOHYDRATE AND SODIUM CHLORIDE: 150; 5; 450 INJECTION, SOLUTION INTRAVENOUS at 05:57

## 2017-12-31 RX ADMIN — ASPIRIN 81 MG 81 MG: 81 TABLET ORAL at 16:59

## 2017-12-31 RX ADMIN — METOCLOPRAMIDE 5 MG: 5 INJECTION, SOLUTION INTRAMUSCULAR; INTRAVENOUS at 05:57

## 2017-12-31 RX ADMIN — ATORVASTATIN CALCIUM 20 MG: 20 TABLET, FILM COATED ORAL at 16:59

## 2017-12-31 NOTE — PROGRESS NOTES
Interval Follow-Up Note     Subjective:  Main problem PSBO. She feels much better today. She denies any abdominal pain. No nausea or vomiting. She had small bowel movements. No blood in the stool melena. She is tolerating clear liquid diet. KUB was good this morning. OBJECTIVE:   BP (!) 140/79   Pulse 98   Temp 99.3 °F (37.4 °C)   Resp 18   Ht 4' 11\" (1.499 m)   Wt 203 lb 11.2 oz (92.4 kg)   SpO2 97%   BMI 41.14 kg/m²   Body mass index is 41.14 kg/m². GEN: A O x 3 in NAD   HEENT: Conjunctivae clear. Eyelids normal. No lymphadenopathy. No thyroid mass. CV: s1s2, RRR, no rubs. PULM: No accessory muscle use. Normal breath sounds bilaterally. ABD/GI: Soft NT ND +BS  EXT: No edema or rash. Warm skin. No joint swelling. Limited neuro exam intact.      Lab Results   Component Value Date    WBC 9.7 12/31/2017    HGB 11.4 (L) 12/31/2017    HCT 34.8 (L) 12/31/2017    MCV 91.2 12/31/2017     12/31/2017     12/31/2017    K 3.7 12/31/2017     12/31/2017    CO2 23 12/31/2017    BUN 10 12/31/2017    CREATININE 0.54 12/31/2017    LABPROT 6.8 12/11/2012    LABALBU 3.3 (L) 12/28/2017    BILITOT 0.48 12/28/2017    ALKPHOS 49 12/28/2017    AST 27 12/28/2017    ALT 18 12/28/2017      Lab Results   Component Value Date    RBC 3.82 (L) 12/31/2017    HGB 11.4 (L) 12/31/2017    MCV 91.2 12/31/2017    MCH 30.0 12/31/2017    MCHC 32.9 12/31/2017    RDW 14.0 12/31/2017    MPV 9.1 12/31/2017    BASOPCT 0 12/31/2017    LYMPHSABS 1.20 12/31/2017    MONOSABS 0.90 (H) 12/31/2017    NEUTROABS 7.50 12/31/2017    EOSABS 0.20 12/31/2017    BASOSABS 0.00 12/31/2017        Past Medical History:   Diagnosis Date    Arthritis     Glaucoma     Hypertension           Current Facility-Administered Medications:     metoclopramide (REGLAN) injection 5 mg, 5 mg, Intravenous, 4 times per day, Shea Leonardo DO, 5 mg at 12/31/17 0557    sodium chloride (OCEAN, BABY AYR) 0.65 % nasal spray 2 spray, 2 spray, Each Eyes, Nightly, Eulice Keene, DO, 1 drop at 12/30/17 2306     Xr Abdomen (kub) (single Ap View)    Result Date: 12/31/2017  EXAMINATION: SUPINE VIEW(S) OF THE ABDOMEN 12/31/2017 6:25 am COMPARISON: Small-bowel follow-through dated 12/29/2017 HISTORY: ORDERING SYSTEM PROVIDED HISTORY: SBO TECHNOLOGIST PROVIDED HISTORY: Reason for exam:->SBO Ordering Physician Provided Reason for Exam: SBO Acuity: Unknown Type of Exam: Unknown FINDINGS: Enteric tube is in place with distal tip overlying the expected location of the distal stomach. Side hole is not well-visualized. There is a nonspecific, nonobstructed bowel gas pattern. Surgical clips are noted in the right upper quadrant. Assessment for free air is limited on supine imaging. No evidence of bowel obstruction. Ct Abdomen Pelvis W Iv Contrast    Result Date: 12/29/2017  EXAMINATION: CT OF THE ABDOMEN AND PELVIS WITH CONTRAST 12/27/2017 10:58 pm TECHNIQUE: CT of the abdomen and pelvis was performed with the administration of intravenous contrast. Multiplanar reformatted images are provided for review. Dose modulation, iterative reconstruction, and/or weight based adjustment of the mA/kV was utilized to reduce the radiation dose to as low as reasonably achievable. COMPARISON: None HISTORY: ORDERING SYSTEM PROVIDED HISTORY: Abdominal pain, possible Bowel obstruction TECHNOLOGIST PROVIDED HISTORY: IV Only Contrast FINDINGS: Lower Chest: Peripheral reticulations the lung bases may represent atelectasis or early fibrosis. Otherwise visualized lung bases demonstrate no acute consolidation. No acute abnormality of the visualized portion of the heart and mediastinum. Calcifications are noted in area of the aortic valve. Organs: No focal liver lesion identified. Mild intrahepatic bile duct dilatation is noted. Status post cholecystectomy. CBD is not significantly dilated. Central portal vein appears widely patent. Right adrenal gland is unremarkable.

## 2017-12-31 NOTE — PROGRESS NOTES
General Surgery Progress Note            PATIENT NAME: Ana Medina     TODAY'S DATE: 12/31/2017, 9:41 AM    SUBJECTIVE:    Pt  Seen and examined. Feels better. NGT clamped overnight. Denies N/V.     OBJECTIVE:   VITALS:  BP (!) 140/79   Pulse 98   Temp 99.3 °F (37.4 °C)   Resp 18   Ht 4' 11\" (1.499 m)   Wt 203 lb 11.2 oz (92.4 kg)   SpO2 97%   BMI 41.14 kg/m²      INTAKE/OUTPUT:      Intake/Output Summary (Last 24 hours) at 12/31/17 0941  Last data filed at 12/31/17 0557   Gross per 24 hour   Intake             1000 ml   Output              900 ml   Net              100 ml                 CONSTITUTIONAL:  awake and alert. no apparent distress  HEART:   RRR  LUNGS:   CTAB  ABDOMEN:   Abdomen soft, non-tender, non-distended. +BS. + flatus/BM  EXTREMITIES:   1+ edema    Data:  CBC:   Lab Results   Component Value Date    WBC 9.7 12/31/2017    RBC 3.82 12/31/2017    HGB 11.4 12/31/2017    HCT 34.8 12/31/2017    MCV 91.2 12/31/2017    MCH 30.0 12/31/2017    MCHC 32.9 12/31/2017    RDW 14.0 12/31/2017     12/31/2017    MPV 9.1 12/31/2017     BMP:    Lab Results   Component Value Date     12/31/2017    K 3.7 12/31/2017     12/31/2017    CO2 23 12/31/2017    BUN 10 12/31/2017    LABALBU 3.3 12/28/2017    CREATININE 0.54 12/31/2017    CALCIUM 8.8 12/31/2017    GFRAA >60 12/31/2017    LABGLOM >60 12/31/2017    GLUCOSE 128 12/31/2017       Radiology Review:  KUB reviewed      ASSESSMENT     Principal Problem:    Small bowel obstruction  Active Problems:    Osteoarthritis    Essential hypertension    Hypernatremia  partial SBO, resolving    Plan  1. DC NGT  2. Clears  3.  GI note appreciated

## 2017-12-31 NOTE — PROGRESS NOTES
11:30 AM     Performed at [address] [facility] 3 [phone]       No results found for: POCPH, PHART, PH, POCPCO2, WVU7YBP, PCO2, POCPO2, PO2ART, PO2, POCHCO3, UVC7MJG, HCO3, NBEA, PBEA, BEART, BE, THGBART, THB, HPG3XBI, IMGE3EWG, G3AMZZSK, O2SAT, FIO2    Radiology:    Kub:     Impression   No evidence of bowel obstruction. Physical Examination:        General appearance:  alert, cooperative and no distress  Mental Status:  oriented to person, place and time and normal affect  Lungs:  clear to auscultation bilaterally, normal effort  Heart:  regular rate and rhythm, no murmur  Abdomen:  soft, nontender, nondistended, normal bowel sounds, no masses, hepatomegaly, splenomegaly  Extremities:  no redness, tenderness in the calves; 1+ ble edema  Skin:  no gross lesions, rashes, induration    Assessment:        Primary Problem  Small bowel obstruction    Active Hospital Problems    Diagnosis Date Noted    Hypernatremia [E87.0] 12/30/2017    Osteoarthritis [M19.90] 12/28/2017    Essential hypertension [I10] 12/28/2017    Small bowel obstruction [K56.609] 12/27/2017       Plan:        1. Full liquids tonight, adv as izzy tomorrow  2. Resume some home meds  3.  Likely dc home 24h    Thai Ledesma, DO  12/31/2017  2:16 PM

## 2018-01-01 VITALS
RESPIRATION RATE: 16 BRPM | HEIGHT: 59 IN | WEIGHT: 203.7 LBS | SYSTOLIC BLOOD PRESSURE: 123 MMHG | TEMPERATURE: 98.4 F | BODY MASS INDEX: 41.07 KG/M2 | HEART RATE: 72 BPM | OXYGEN SATURATION: 96 % | DIASTOLIC BLOOD PRESSURE: 61 MMHG

## 2018-01-01 LAB
ANION GAP SERPL CALCULATED.3IONS-SCNC: 12 MMOL/L (ref 9–17)
BUN BLDV-MCNC: 9 MG/DL (ref 8–23)
BUN/CREAT BLD: 16 (ref 9–20)
CALCIUM SERPL-MCNC: 8.7 MG/DL (ref 8.6–10.4)
CHLORIDE BLD-SCNC: 108 MMOL/L (ref 98–107)
CO2: 24 MMOL/L (ref 20–31)
CREAT SERPL-MCNC: 0.58 MG/DL (ref 0.5–0.9)
GFR AFRICAN AMERICAN: >60 ML/MIN
GFR NON-AFRICAN AMERICAN: >60 ML/MIN
GFR SERPL CREATININE-BSD FRML MDRD: ABNORMAL ML/MIN/{1.73_M2}
GFR SERPL CREATININE-BSD FRML MDRD: ABNORMAL ML/MIN/{1.73_M2}
GLUCOSE BLD-MCNC: 101 MG/DL (ref 70–99)
MAGNESIUM: 1.8 MG/DL (ref 1.6–2.6)
POTASSIUM SERPL-SCNC: 3.9 MMOL/L (ref 3.7–5.3)
SODIUM BLD-SCNC: 144 MMOL/L (ref 135–144)

## 2018-01-01 PROCEDURE — 83735 ASSAY OF MAGNESIUM: CPT

## 2018-01-01 PROCEDURE — 6370000000 HC RX 637 (ALT 250 FOR IP): Performed by: INTERNAL MEDICINE

## 2018-01-01 PROCEDURE — 2500000003 HC RX 250 WO HCPCS: Performed by: INTERNAL MEDICINE

## 2018-01-01 PROCEDURE — 6360000002 HC RX W HCPCS: Performed by: SURGERY

## 2018-01-01 PROCEDURE — 80048 BASIC METABOLIC PNL TOTAL CA: CPT

## 2018-01-01 PROCEDURE — 36415 COLL VENOUS BLD VENIPUNCTURE: CPT

## 2018-01-01 PROCEDURE — 99232 SBSQ HOSP IP/OBS MODERATE 35: CPT | Performed by: INTERNAL MEDICINE

## 2018-01-01 RX ADMIN — SALINE NASAL SPRAY 2 SPRAY: 1.5 SOLUTION NASAL at 00:49

## 2018-01-01 RX ADMIN — ATORVASTATIN CALCIUM 20 MG: 20 TABLET, FILM COATED ORAL at 08:19

## 2018-01-01 RX ADMIN — LATANOPROST 1 DROP: 50 SOLUTION/ DROPS OPHTHALMIC at 00:48

## 2018-01-01 RX ADMIN — POTASSIUM CHLORIDE, DEXTROSE MONOHYDRATE AND SODIUM CHLORIDE: 150; 5; 450 INJECTION, SOLUTION INTRAVENOUS at 05:46

## 2018-01-01 RX ADMIN — METOCLOPRAMIDE 5 MG: 5 INJECTION, SOLUTION INTRAMUSCULAR; INTRAVENOUS at 05:46

## 2018-01-01 RX ADMIN — ASPIRIN 81 MG 81 MG: 81 TABLET ORAL at 08:19

## 2018-01-01 RX ADMIN — LISINOPRIL 10 MG: 10 TABLET ORAL at 08:19

## 2018-01-01 NOTE — DISCHARGE SUMMARY
[facility]    Discharge Summary     Patient ID: Rodena Bumpers  :  1940   MRN: [id number]     ACCOUNT:  [de-identified]   Patient's PCP: Shona Fermin MD  Admit Date: 2017   Discharge Date: 2018     Length of Stay: 5  Code Status:  Full Code  Admitting Physician: Sandra Butterfield DO  Discharge Physician: Zackary Ledesma DO     Active Discharge Diagnoses:     Primary Problem  Small bowel obstruction      Hospital Problems  Active Hospital Problems    Diagnosis Date Noted    Osteoarthritis [M19.90] 2017    Essential hypertension [I10] 2017    Small bowel obstruction [R57.100] 2017       Admission Condition:  fair     Discharged Condition: good    Hospital Stay:     Hospital Course:  Rodena Bumpers is a 65 y.o. female who was admitted for the management of   Small bowel obstruction , presented to ER with Abdominal Pain; Emesis; and Nausea      Admitted with psbo, seen by surgery. Conservative treatment with ng to suction, but despite resolution of sbo, copious ng output persisted so surgery suggested gi evaluation and iv reglan started. Ng output slowed down and she tolerated clamping, had her diet advanced and is released.   Will need outpt colonoscopy    Significant therapeutic interventions: see above    Significant Diagnostic Studies:   Labs / Micro:  CBC:   Lab Results   Component Value Date    WBC 9.7 2017    RBC 3.82 2017    HGB 11.4 2017    HCT 34.8 2017    MCV 91.2 2017    MCH 30.0 2017    MCHC 32.9 2017    RDW 14.0 2017     2017     CMP:    Lab Results   Component Value Date    GLUCOSE 101 2018     2018    K 3.9 2018     2018    CO2 24 2018    BUN 9 2018    CREATININE 0.58 2018    ANIONGAP 12 2018    ALKPHOS 49 2017    ALT 18 2017    AST 27 2017    BILITOT 0.48 2017 LABALBU 3.3 12/28/2017    ALBUMIN NOT REPORTED 12/28/2017    LABGLOM >60 01/01/2018    GFRAA >60 01/01/2018    GFR      01/01/2018    GFR NOT REPORTED 01/01/2018    PROT 6.2 12/28/2017    CALCIUM 8.7 01/01/2018         Radiology:    Xr Abdomen (kub) (single Ap View)    Result Date: 12/31/2017  EXAMINATION: SUPINE VIEW(S) OF THE ABDOMEN 12/31/2017 6:25 am COMPARISON: Small-bowel follow-through dated 12/29/2017 HISTORY: ORDERING SYSTEM PROVIDED HISTORY: SBO TECHNOLOGIST PROVIDED HISTORY: Reason for exam:->SBO Ordering Physician Provided Reason for Exam: SBO Acuity: Unknown Type of Exam: Unknown FINDINGS: Enteric tube is in place with distal tip overlying the expected location of the distal stomach. Side hole is not well-visualized. There is a nonspecific, nonobstructed bowel gas pattern. Surgical clips are noted in the right upper quadrant. Assessment for free air is limited on supine imaging. No evidence of bowel obstruction. Ct Abdomen Pelvis W Iv Contrast    Result Date: 12/29/2017  EXAMINATION: CT OF THE ABDOMEN AND PELVIS WITH CONTRAST 12/27/2017 10:58 pm TECHNIQUE: CT of the abdomen and pelvis was performed with the administration of intravenous contrast. Multiplanar reformatted images are provided for review. Dose modulation, iterative reconstruction, and/or weight based adjustment of the mA/kV was utilized to reduce the radiation dose to as low as reasonably achievable. COMPARISON: None HISTORY: ORDERING SYSTEM PROVIDED HISTORY: Abdominal pain, possible Bowel obstruction TECHNOLOGIST PROVIDED HISTORY: IV Only Contrast FINDINGS: Lower Chest: Peripheral reticulations the lung bases may represent atelectasis or early fibrosis. Otherwise visualized lung bases demonstrate no acute consolidation. No acute abnormality of the visualized portion of the heart and mediastinum. Calcifications are noted in area of the aortic valve. Organs: No focal liver lesion identified.   Mild intrahepatic bile duct

## 2018-01-01 NOTE — PROGRESS NOTES
General Surgery Progress Note            PATIENT NAME: Chad Castellon Mccoy     TODAY'S DATE: 1/1/2018, 11:27 AM    SUBJECTIVE:    Pt  Seen and examined. Doing well. OBJECTIVE:   VITALS:  /61   Pulse 72   Temp 98.4 °F (36.9 °C) (Oral)   Resp 16   Ht 4' 11\" (1.499 m)   Wt 203 lb 11.2 oz (92.4 kg)   SpO2 96%   BMI 41.14 kg/m²      INTAKE/OUTPUT:      Intake/Output Summary (Last 24 hours) at 01/01/18 1127  Last data filed at 01/01/18 0711   Gross per 24 hour   Intake          1965.67 ml   Output             2700 ml   Net          -734.33 ml                 CONSTITUTIONAL:  awake and alert. no apparent distress  HEART:   RRR  LUNGS:   CTAB  ABDOMEN:   Abdomen obese, soft, non-tender, non-distended. +BS. + flatus/BM. Tolerating diet  EXTREMITIES:   No edema    Data:  CBC:   Lab Results   Component Value Date    WBC 9.7 12/31/2017    RBC 3.82 12/31/2017    HGB 11.4 12/31/2017    HCT 34.8 12/31/2017    MCV 91.2 12/31/2017    MCH 30.0 12/31/2017    MCHC 32.9 12/31/2017    RDW 14.0 12/31/2017     12/31/2017    MPV 9.1 12/31/2017     BMP:    Lab Results   Component Value Date     01/01/2018    K 3.9 01/01/2018     01/01/2018    CO2 24 01/01/2018    BUN 9 01/01/2018    LABALBU 3.3 12/28/2017    CREATININE 0.58 01/01/2018    CALCIUM 8.7 01/01/2018    GFRAA >60 01/01/2018    LABGLOM >60 01/01/2018    GLUCOSE 101 01/01/2018         ASSESSMENT     Principal Problem:    Small bowel obstruction  Active Problems:    Osteoarthritis    Essential hypertension  partial SBO, resolving    Plan  1. Ok to DC home from surgery standpoint  2.  Outpatient follow up as needed

## 2018-01-01 NOTE — PROGRESS NOTES
[facility]    Progress Note    1/1/2018    10:55 AM    Name:   Ree Watson  MRN:     [id number]     [place]:      [de-identified]   Room:   2021/2021-01   Day:  5  Admit Date:  12/27/2017  9:48 PM    PCP:   Garfield Albarado MD  Code Status:  Full Code    Subjective:     C/C:   Chief Complaint   Patient presents with    Abdominal Pain    Emesis    Nausea     Interval History Status: improved. Tolerating reg diet for breakfast, denies c/o  Had a normal BM    Brief History:     66 yo admitted with abdominal pain, N/V.  She was found to have a SBO on CT abdomen     Seen by surgery who requested gi due to resolution of sbo but copious ng output still     Ng removed 12/31 and tolerating it well; tolerating clear liquids then full liquids and now reg diet    Review of Systems:     Constitutional:  negative for chills, fevers, sweats  Respiratory:  negative for cough, dyspnea on exertion, shortness of breath, wheezing  Cardiovascular:  negative for chest pain, chest pressure/discomfort, lower extremity edema, palpitations  Gastrointestinal:  negative for abdominal pain, constipation, diarrhea, nausea, vomiting  Neurological:  negative for dizziness, headache    Medications:      Allergies:  No Known Allergies    Current Meds:   Scheduled Meds:    lisinopril  10 mg Oral Daily    atorvastatin  20 mg Oral Daily    aspirin  81 mg Oral Daily    metoclopramide  5 mg Intravenous 4 times per day    sodium chloride flush  10 mL Intravenous 2 times per day    enoxaparin  40 mg Subcutaneous Daily    latanoprost  1 drop Both Eyes Nightly     Continuous Infusions:    dextrose 5% and 0.45% NaCl with KCl 20 mEq 50 mL/hr at 01/01/18 0546     PRN Meds: sodium chloride, diatrizoate meglumine-sodium, sodium chloride flush, potassium chloride **OR** potassium chloride **OR** potassium chloride, magnesium sulfate, acetaminophen, morphine **OR** morphine, magnesium hydroxide, bisacodyl, ondansetron, nicotine, benzocaine-menthol    Data:     Past Medical History:   has a past medical history of Arthritis; Glaucoma; and Hypertension. Social History:   reports that she has never smoked. She has never used smokeless tobacco. She reports that she does not drink alcohol or use drugs. Family History: History reviewed. No pertinent family history. Vitals:  /61   Pulse 72   Temp 98.4 °F (36.9 °C) (Oral)   Resp 16   Ht 4' 11\" (1.499 m)   Wt 203 lb 11.2 oz (92.4 kg)   SpO2 96%   BMI 41.14 kg/m²   Temp (24hrs), Av.3 °F (36.8 °C), Min:98.2 °F (36.8 °C), Max:98.4 °F (36.9 °C)    No results for input(s): POCGLU in the last 72 hours. I/O (24Hr): Intake/Output Summary (Last 24 hours) at 18 1055  Last data filed at 18 0711   Gross per 24 hour   Intake          1965.67 ml   Output             2700 ml   Net          -734.33 ml       Labs:    Hematology:  Recent Labs      17   0648  17   0538   WBC  8.9  9.7   RBC  3.94*  3.82*   HGB  11.7*  11.4*   HCT  35.6*  34.8*   MCV  90.2  91.2   MCH  29.6  30.0   MCHC  32.9  32.9   RDW  13.4  14.0   PLT  185  176   MPV  NOT REPORTED  9.1     Chemistry:  Recent Labs      17   0648  17   0538  18   0730   NA  147*  139  144   K  3.7  3.7  3.9   CL  109*  104  108*   CO2  26  23  24   GLUCOSE  134*  128*  101*   BUN  15  10  9   CREATININE  0.57  0.54  0.58   MG  1.9  1.7  1.8   ANIONGAP  12  12  12   LABGLOM  >60  >60  >60   GFRAA  >60  >60  >60   CALCIUM  8.6  8.8  8.7     No results for input(s): PROT, LABALBU, LABA1C, J6CSNTU, C7ZXQTE, FT4, TSH, AST, ALT, LDH, GGT, ALKPHOS, LABGGT, BILITOT, BILIDIR, AMMONIA, AMYLASE, LIPASE, LACTATE, CHOL, HDL, LDLCHOLESTEROL, CHOLHDLRATIO, TRIG, VLDL, ANV96EN, PHENYTOIN, PHENYF, URICACID, POCGLU in the last 72 hours.       Lab Results   Component Value Date/Time    SPECIAL NOT REPORTED 2012 11:30 AM     Lab Results   Component Value Date/Time CULTURE NO GROWTH 6 DAYS 12/11/2012 11:30 AM    CULTURE  12/11/2012 11:30 AM     Performed at [facility] [address] [facility] [phone]       No results found for: POCPH, PHART, PH, POCPCO2, LML4XSC, PCO2, POCPO2, PO2ART, PO2, POCHCO3, VBF3KRU, HCO3, NBEA, PBEA, BEART, BE, THGBART, THB, ZQW2RIL, KRUU5GJT, B9ZOUCCC, O2SAT, FIO2    Radiology:    Nothing new      Physical Examination:        General appearance:  alert, cooperative and no distress  Mental Status:  oriented to person, place and time and normal affect  Lungs:  clear to auscultation bilaterally, normal effort  Heart:  regular rate and rhythm, no murmur  Abdomen:  soft, nontender, nondistended, normal bowel sounds, no masses, hepatomegaly, splenomegaly  Extremities:  no edema, redness, tenderness in the calves  Skin:  no gross lesions, rashes, induration    Assessment:        Primary Problem  Small bowel obstruction    Active Hospital Problems    Diagnosis Date Noted    Osteoarthritis [M19.90] 12/28/2017    Essential hypertension [I10] 12/28/2017    Small bowel obstruction [Z82.242] 12/27/2017       Plan:        1.  Dc home as sbo resolved    Zackary Ledesma DO  1/1/2018  10:55 AM

## 2018-01-01 NOTE — PLAN OF CARE
Problem: Falls - Risk of  Goal: Absence of falls  Outcome: Ongoing  Siderails up x 2  Hourly rounding. Call light in reach. Instructed to call for assist before attempting out of bed. Remains free from falls and accidental injury at this time. Floor free from obstacles, and bed is locked and in lowest position. Adequate lighting provided.       Problem: Pain:  Goal: Pain level will decrease  Pain level will decrease   Outcome: Ongoing    Goal: Control of acute pain  Control of acute pain   Outcome: Ongoing    Goal: Control of chronic pain  Control of chronic pain   Outcome: Ongoing

## 2018-01-04 LAB — IBD PANEL: NORMAL
